# Patient Record
(demographics unavailable — no encounter records)

---

## 2024-10-10 NOTE — HISTORY OF PRESENT ILLNESS
[FreeTextEntry1] : 61 year old with a history of asthma, strong family history of CAD (dad MI at 56, mom CHF 66, all 6 siblings with CAD), bronchitis, hiatal hernia, hyperlipidemia.  Since his last visit,  he finished on cardiac rehab and doing well. . He has been complaining of a left sided pain that is worse with upper extremity movements.  He notes that his WASHINGTON up the stairs improved. He denies any  PND, orthopnea, lower extremity edema, near syncope, syncope, stroke like symptoms.  he is planning to go to cardiac rehab.  Medication reconciliation performed. He is compliant with his medications.

## 2024-10-10 NOTE — DISCUSSION/SUMMARY
[FreeTextEntry1] : 61 year man with a history as listed presents for a followup cardiac evaluation.  Yany is doing well.  He denies any anginal symptoms. Clinically he is euvolemic on exam. His EKG did not reveal any significant ischemic changes. He will continue DAPT. He will continue plavix for 6 months.  HIs blood pressure is controlled. He will continue  Toprol 25mg Qday.   He will continue Crestor 40mg Qday.  Exercise and diet counseling was performed in order to reduce her future cardiovascular risk.  He will followup with me in 3 months or sooner if necessary.  YANY will followup with you for all of his other medical needs.  [EKG obtained to assist in diagnosis and management of assessed problem(s)] : EKG obtained to assist in diagnosis and management of assessed problem(s)

## 2024-10-10 NOTE — PHYSICAL EXAM
[Well Groomed] : well groomed [General Appearance - In No Acute Distress] : no acute distress [Normal Conjunctiva] : the conjunctiva exhibited no abnormalities [Eyelids - No Xanthelasma] : the eyelids demonstrated no xanthelasmas [Normal Oral Mucosa] : normal oral mucosa [No Oral Pallor] : no oral pallor [No Oral Cyanosis] : no oral cyanosis [Normal Jugular Venous A Waves Present] : normal jugular venous A waves present [Normal Jugular Venous V Waves Present] : normal jugular venous V waves present [No Jugular Venous Cool A Waves] : no jugular venous cool A waves [Respiration, Rhythm And Depth] : normal respiratory rhythm and effort [Exaggerated Use Of Accessory Muscles For Inspiration] : no accessory muscle use [Auscultation Breath Sounds / Voice Sounds] : lungs were clear to auscultation bilaterally [Abdomen Soft] : soft [Abdomen Tenderness] : non-tender [Abdomen Mass (___ Cm)] : no abdominal mass palpated [Abnormal Walk] : normal gait [Gait - Sufficient For Exercise Testing] : the gait was sufficient for exercise testing [Nail Clubbing] : no clubbing of the fingernails [Cyanosis, Localized] : no localized cyanosis [Petechial Hemorrhages (___cm)] : no petechial hemorrhages [Skin Color & Pigmentation] : normal skin color and pigmentation [] : no rash [No Venous Stasis] : no venous stasis [Skin Lesions] : no skin lesions [No Skin Ulcers] : no skin ulcer [No Xanthoma] : no  xanthoma was observed [Oriented To Time, Place, And Person] : oriented to person, place, and time [Affect] : the affect was normal [Mood] : the mood was normal [No Anxiety] : not feeling anxious [Normal Rate] : normal [Rhythm Regular] : regular [Normal S1] : normal S1 [Normal S2] : normal S2 [No Gallop] : no gallop heard [No Murmur] : no murmurs heard [2+] : left 2+ [No Pitting Edema] : no pitting edema present [Right Carotid Bruit] : no bruit heard over the right carotid [Left Carotid Bruit] : no bruit heard over the left carotid [Bruit] : no bruit heard

## 2024-10-10 NOTE — CARDIOLOGY SUMMARY
[de-identified] : SB [de-identified] : 11/2019 TMET 12 METs excellent ET no ischemic changes.  [de-identified] : 4/2024 normal LV function.  [de-identified] : 2019 myocardial bridge in the mLAD. Otherwise he had nonobstructive disease.  2024 s/p pLAD PCI

## 2024-12-02 NOTE — DISCUSSION/SUMMARY
[FreeTextEntry1] : 61 year man with a history as listed presents for a followup cardiac evaluation.  Yany is doing well.  He denies any anginal symptoms. Clinically he is euvolemic on exam. His EKG did not reveal any significant ischemic changes. He will continue DAPT. He will continue plavix for till at least april.  HIs blood pressure is controlled. He will continue  Toprol 25mg Qday.   He will continue Crestor 40mg Qday.  Exercise and diet counseling was performed in order to reduce her future cardiovascular risk. Given his CV risk and recently CAD he would benefit from a GLP1.  He will followup with me in 4-6  months or sooner if necessary.  YNAY will followup with you for all of his other medical needs.  [EKG obtained to assist in diagnosis and management of assessed problem(s)] : EKG obtained to assist in diagnosis and management of assessed problem(s)

## 2024-12-02 NOTE — CARDIOLOGY SUMMARY
[de-identified] : SB [de-identified] : 11/2019 TMET 12 METs excellent ET no ischemic changes.  [de-identified] : 4/2024 normal LV function.  [de-identified] : 2019 myocardial bridge in the mLAD. Otherwise he had nonobstructive disease.  2024 s/p pLAD PCI

## 2024-12-02 NOTE — HISTORY OF PRESENT ILLNESS
[FreeTextEntry1] : 61 year old with a history of asthma, strong family history of CAD (dad MI at 56, mom CHF 66, all 6 siblings with CAD), bronchitis, hiatal hernia, hyperlipidemia.  Since his last visit,  he has been feeling relatively well. He notes that his WASHINGTON up the stairs improved but still rpesent.  He denies any  PND, orthopnea, lower extremity edema, near syncope, syncope, stroke like symptoms.  he has gained weight over the holidays. He is still exercising  about 5 days a week, with a combination of weightlifting and cardio.  Medication reconciliation performed. He is compliant with his medications.

## 2025-01-19 NOTE — REVIEW OF SYSTEMS
Code: Full  Diet: consistent carbohydrate and low potassium DASH   DVT ppx: enoxaparin   Dispo: pending clinical course, likely home [Dyspnea on exertion] : dyspnea during exertion [Negative] : Heme/Lymph

## 2025-04-16 NOTE — CARDIOLOGY SUMMARY
[de-identified] : SB [de-identified] : 11/2019 TMET 12 METs excellent ET no ischemic changes.  [de-identified] : 4/2024 normal LV function.  [de-identified] : 2019 myocardial bridge in the mLAD. Otherwise he had nonobstructive disease.  2024 s/p pLAD PCI

## 2025-04-16 NOTE — DISCUSSION/SUMMARY
[FreeTextEntry1] : 61 year man with a history as listed presents for a followup cardiac evaluation.  Yany is doing well.  He denies any anginal symptoms. Clinically he is euvolemic on exam. His EKG did not reveal any significant ischemic changes. He will continue DAPT. He will continue plavix for till at least april.  HIs blood pressure is controlled. He will continue  Toprol 25mg Qday.   He will continue Crestor 40mg Qday.  Exercise and diet counseling was performed in order to reduce her future cardiovascular risk. Given his CV risk, obesity, and CAD will try Zepbound 2.5mg Qweek. If starts will RTC in 4 weeks for weight check.  He will followup with me in 4-6  months or sooner if necessary.  YANY will followup with you for all of his other medical needs.  [EKG obtained to assist in diagnosis and management of assessed problem(s)] : EKG obtained to assist in diagnosis and management of assessed problem(s)